# Patient Record
(demographics unavailable — no encounter records)

---

## 2024-11-28 NOTE — DISCUSSION/SUMMARY
[FreeTextEntry1] : EKG SR incomplete RBBB CP, atypical check a stress echo or echo and a stress ekg if unable to approve via insurance.  [EKG obtained to assist in diagnosis and management of assessed problem(s)] : EKG obtained to assist in diagnosis and management of assessed problem(s)

## 2024-11-28 NOTE — REASON FOR VISIT
[Symptom and Test Evaluation] : symptom and test evaluation [FreeTextEntry1] : 28M comes in for a visit. He remarks on occasional chest pain. The discomfort is left sided. Symptoms noted for a few weeks.  He remarks on feeling them with activity and at rest. Occasional dyspnea noted. He was in ER and Urgent Care.  We are discussing a work up.

## 2025-04-09 NOTE — REASON FOR VISIT
[Symptom and Test Evaluation] : symptom and test evaluation [FreeTextEntry1] : 29M comes in for a visit. He remarks on occasional chest pain. The discomfort is left sided. Symptoms noted for a few weeks.  He remarks on feeling them with activity and at rest. Occasional dyspnea noted. He was in ER and Urgent Care.  We are discussing a work up. Echo completed

## 2025-04-29 NOTE — DISCUSSION/SUMMARY
[FreeTextEntry1] : CP testing reviewed. Inclined towards a conservative follow up in this patient. We had a careful discussion regarding diet and exercise. Will be happy to re-evaluate. EZ DE PAZ and I discussed his lipid panel and individualized target LDL goal. At this point, will do diet and exercise with anticipation of re-evaluating labs in 3-6 months

## 2025-04-29 NOTE — REASON FOR VISIT
[Symptom and Test Evaluation] : symptom and test evaluation [FreeTextEntry1] : 29M comes in for a visit. He remarks on occasional chest pain. The discomfort is left sided. Symptoms noted for a few weeks.  He remarks on feeling them with activity and at rest. Occasional dyspnea noted. He was in ER and Urgent Care.  We are discussing a work up. Echo completed Now comes in after stress ekg, as we unable to approve a stress echo.